# Patient Record
(demographics unavailable — no encounter records)

---

## 2025-05-19 NOTE — ASSESSMENT
[FreeTextEntry1] : Xrays reviewed with patient Treatment options discussed  Tylenol / ibuprofen 800 for pain / inflammation - GI precautions discussed Ice Discussed importance of taking deep breaths to prevent secondary pneumonia / atelectasis  Follow up in 1 week for repeat xrays

## 2025-05-19 NOTE — HISTORY OF PRESENT ILLNESS
[10] : 10 [3] : 3 [Rest] : rest [Full time] : Work status: full time [de-identified] : 5/19/25 77 y/o M here with LT side rib pain after falling on 5/13/25. Reports that it was getting better until he lifted a heavy mattress yesterday and he aggravated it. Denies PMH in ribs. [] : no [de-identified] : coughing, sudden movement.  Area L Indication Text: Tumors in this location are included in Area L (trunk and extremities).  Mohs surgery is indicated for larger tumors, or tumors with aggressive histologic features, in these anatomic locations.

## 2025-05-19 NOTE — PHYSICAL EXAM
[Left] : left rib [Fracture] : Fracture [] : negative sitting straight leg raise [FreeTextEntry8] : ttp left sided ribs

## 2025-06-03 NOTE — ASSESSMENT
[FreeTextEntry1] : Continue activity modifications: limit bending/twisting/lifting Deep breathing hourly while awake Ibuprofen prn

## 2025-06-03 NOTE — HISTORY OF PRESENT ILLNESS
[10] : 10 [3] : 3 [Rest] : rest [Full time] : Work status: full time [de-identified] : 06/03/2025 Fractured left ribs in a fall 3 weeks ago, feeling much less pain 5/19/25 75 y/o M here with LT side rib pain after falling on 5/13/25. Reports that it was getting better until he lifted a heavy mattress yesterday and he aggravated it. Denies PMH in ribs. [] : no [de-identified] : coughing, sudden movement.

## 2025-06-03 NOTE — PHYSICAL EXAM
[] : patient ambulates without assistive device [Left] : left rib [Fracture] : Fracture [FreeTextEntry8] : tender anterior axillary line approx/8/9, good excursion of chest to inspiration [FreeTextEntry5] : no callous yet